# Patient Record
(demographics unavailable — no encounter records)

---

## 2024-10-11 NOTE — RISK ASSESSMENT
[0] : 2) Feeling down, depressed, or hopeless: Not at all (0) [PHQ-2 Negative - No further assessment needed] : PHQ-2 Negative - No further assessment needed [Has anyone in your immediate family (parents, grandparents, siblings) or other more distant relatives (aunts, uncles, cousins)  of heart] : Has anyone in your immediate family (parents, grandparents, siblings) or other more distant relatives (aunts, uncles, cousins)  of heart problems or had an unexpected sudden death before age 50 (This would include unexpected drownings, unexplained car accidents in which the relative was driving or sudden infant death syndrome.)? Yes [No Increased risk of SCA or SCD] : No Increased risk of SCA or SCD    [[id number]] : 0 [Have you ever fainted, passed out or had an unexplained seizure suddenly and without warning, especially during exercise or in response] : Have you ever fainted, passed out or had an unexplained seizure suddenly and without warning, especially during exercise or in response to sudden loud noises such as doorbells, alarm clocks and ringing telephones? No [Have you ever had exercise-related chest pain or shortness of breath?] : Have you ever had exercise-related chest pain or shortness of breath? No [Are you related to anyone with hypertrophic cardiomyopathy or hypertrophic obstructive cardiomyopathy, Marfan syndrome, arrhythmogenic] : Are you related to anyone with hypertrophic cardiomyopathy or hypertrophic obstructive cardiomyopathy, Marfan syndrome, arrhythmogenic right ventricular cardiomyopathy, long QT syndrome, short QT syndrome, Brugada syndrome or catecholaminergic polymorphic ventricular tachycardia, or anyone younger than 50 years with a pacemaker or implantable defibrillator? No

## 2024-10-11 NOTE — END OF VISIT
[] : 2 [FeedbackText] : Sibling visit. Lots of editing needed.  It also didn't capture some details of a new online school or the abnormal physical exam finding which I spoke a lot about during the visit.

## 2024-10-11 NOTE — DISCUSSION/SUMMARY
[Normal Growth] : growth [No Elimination Concerns] : elimination [Continue Regimen] : feeding [No Skin Concerns] : skin [Normal Sleep Pattern] : sleep [Delayed Problem Solving Skills] : delayed problem solving skills [None] : no medical problems [Anticipatory Guidance Given] : Anticipatory guidance addressed as per the history of present illness section [No Vaccines] : no vaccines needed [No Medications] : ~He/She~ is not on any medications [Patient] : patient [Parent/Guardian] : Parent/Guardian [] : The components of the vaccine(s) to be administered today are listed in the plan of care. The disease(s) for which the vaccine(s) are intended to prevent and the risks have been discussed with the caretaker.  The risks are also included in the appropriate vaccination information statements which have been provided to the patient's caregiver.  The caregiver has given consent to vaccinate. [Physical Growth and Development] : physical growth and development [Social and Academic Competence] : social and academic competence [Emotional Well-Being] : emotional well-being [Risk Reduction] : risk reduction [Violence and Injury Prevention] : violence and injury prevention [Met privately with the adolescent for part of the office visit?] : Met privately with the adolescent for part of the office visit? Yes [Initiated discussion about transfer to an adult healthcare provider?] : Initiated discussion about transfer to an adult healthcare provider? Yes  [Adolescent demonstrates understanding of his/her conditions and how to take prescribed medications?] : Adolescent demonstrates understanding of his/her conditions and how to take prescribed medications? No [Adolescent asks questions during each office  visit and participates in the care plan?] : Adolescent asks questions during each office visit and participates in the care plan? No [Adolescent is competent in independently making appointments, filling prescriptions, following up on referrals, and seeking emergency services, as needed?] : Adolescent is competent in independently making appointments, filling prescriptions, following up on referrals, and seeking emergency services, as needed? No [Adolescent's caregivers were provided with the opportunity to discuss their concerns about transferring decision making responsibility to the adolescent?] : Adolescent's caregivers were provided with the opportunity to discuss their concerns about transferring decision making responsibility to the adolescent? No [Discussed using Follow My Health to access health records and communicate with the adolescent's care team?] : Discussed using Follow My Health to access health records and communicate with the adolescent's care team? No [Discussed choices for adult care and assist in identifying possible care providers?] : Discussed choices for adult care and assist in identifying possible care providers? No [Initiated communication with the adult provider that the family and adolescent has selected?] : Initiated communication with the adult provider that the family and adolescent has selected? No [Provided copy of  transition letter?] : Provided copy of transition letter? No [Transferred health records?] : Transferred health records? No [Discussed nuances of care with the adult provider?] : Discussed nuances of care with the adult provider? No [Followed up after the transfer?] : Followed up after the transfer? No [FreeTextEntry1] : Family Wellness Screen was administered. No needs were identified. Total score 0.   Assessment:   - Summary: He is in general good health. Steffen's suspected scoliosis needs to be confirmed and appropriately addressed by an orthopedic surgeon. Furthermore, Steffen voiced feelings of anxiety which may need to be addressed by a therapist if persistent or affective of his daily life.   - Problems:     - Steffen: Suspected Scoliosis, Anxiety   - Differential Diagnosis:     - Scoliosis in Steffen: This needs to be confirmed or ruled out by an orthopedic surgeon     - Anxiety in Steffen: This could be either general anxiety vs situation-related anxiety   Plan:   - Summary: Ponce is advised to see an optometrist for clearer vision assessment and potential prescription of glasses. He also needs some basic screening labs as it's been a long time since his last blood workup. For Steffen, referral to an orthopedist is planned for further examination of the possible scoliosis. Screening labs should also be done for him due to the time since last blood workup. Finally, potential therapy sessions should be considered pertaining to his reported feelings of stress and anxiety.   - Plan:     - Steffen: Referral to orthopedist, Basic screening labs, Consideration of therapy sessions for anxiety     Given relatively high anxiety scores, I will refer to Ketty Bustos as well.

## 2024-10-11 NOTE — PHYSICAL EXAM
[Alert] : alert [No Acute Distress] : no acute distress [Normocephalic] : normocephalic [EOMI Bilateral] : EOMI bilateral [Clear tympanic membranes with bony landmarks and light reflex present bilaterally] : clear tympanic membranes with bony landmarks and light reflex present bilaterally  [Pink Nasal Mucosa] : pink nasal mucosa [Nonerythematous Oropharynx] : nonerythematous oropharynx [Supple, full passive range of motion] : supple, full passive range of motion [No Palpable Masses] : no palpable masses [Clear to Auscultation Bilaterally] : clear to auscultation bilaterally [Regular Rate and Rhythm] : regular rate and rhythm [Normal S1, S2 audible] : normal S1, S2 audible [No Murmurs] : no murmurs [+2 Femoral Pulses] : +2 femoral pulses [Soft] : soft [NonTender] : non tender [Non Distended] : non distended [Normoactive Bowel Sounds] : normoactive bowel sounds [No Hepatomegaly] : no hepatomegaly [No Splenomegaly] : no splenomegaly [No Abnormal Lymph Nodes Palpated] : no abnormal lymph nodes palpated [Normal Muscle Tone] : normal muscle tone [No Gait Asymmetry] : no gait asymmetry [No pain or deformities with palpation of bone, muscles, joints] : no pain or deformities with palpation of bone, muscles, joints [+2 Patella DTR] : +2 patella DTR [Cranial Nerves Grossly Intact] : cranial nerves grossly intact [de-identified] : 10 degree curvature of the spine [de-identified] : Multiple callouses and hyperpigmentation on his hands and knuckles

## 2024-10-11 NOTE — HISTORY OF PRESENT ILLNESS
[Mother] : mother [Yes] : Patient goes to dentist yearly [Needs Immunizations] : needs immunizations [Eats meals with family] : eats meals with family [Has family members/adults to turn to for help] : has family members/adults to turn to for help [Is permitted and is able to make independent decisions] : Is permitted and is able to make independent decisions [Eats regular meals including adequate fruits and vegetables] : eats regular meals including adequate fruits and vegetables [No] : Patient has not had sexual intercourse [With Teen] : teen [With Parent/Guardian] : parent/guardian [Sleep Concerns] : no sleep concerns [Has friends] : does not have friends [Has interests/participates in community activities/volunteers] : does not have interests/participates in community activities/volunteers [Uses electronic nicotine delivery system] : does not use electronic nicotine delivery system [Uses tobacco] : does not use tobacco [Uses drugs] : does not use drugs  [Drinks alcohol] : does not drink alcohol [de-identified] : Has always been home schooled.  Now he is starting an online school program.  [FreeTextEntry1] :    Subjective:   - Summary: Steffen is presenting for his routine physical examinations. He is generally healthy. Steffen reported feeling stressed and anxious about a planned trip to [facility]. Their mother reported no major concerns for both boys. Steffen was in the 40th percentile for his weight.   - Chief Complaint (CC): Routine Physical Examination   - History of Present Illness (HPI): Steffen reported feelings of anxiety particularly about a planned visit to [facility]. He also presented an abnormality, a possible early symptomatic scoliosis, to be examined further by an orthopedist.   - Past Medical History: Steffen has been homeschooled for a while now, but is about to attend a new online high school soon.   - Family History: The mother mentioned paternal side's history of diabetes.   - Social History: Steffen was reported to have been homeschooled and is expected to join an online high school soon. He engaged in recreational activities such as going to [employer] and travel ([place]). The new school is entitled [employer]", which is a school with an online program with small classrooms of 8-10 kids. He is about to undergo a developmental evaluation in [facility].  Unclear if there are therapies provided or not through the school, but the school will provide 504 accommodations.

## 2024-10-23 NOTE — PHYSICAL EXAM
[FreeTextEntry3] : hyperpigmentated of MCP, PIPs, scaly brown plaques of dorsum of hands   comedonal acne on cheeks and forehead

## 2024-10-23 NOTE — ASSESSMENT
[FreeTextEntry1] : #irritant contact dermatitis and #LSC of hands --chronic flaring --mom notes that patient bites hands; there are clear plaques of LSC on PIPs; discussed repeated biting will make lesions persist - START triamcinolone 0.1% ointment BID x 2 weeks PRN flare, take 1 week break before repeated cycles PRN flare. Avoid face, axilla, groin.  SED including atrophy, dyspigmentation, telangiectasias, striae. Proper use reviewed including only using to affected area and avoidance of prolonged use. -RTC 3 months  #acne --chronic flaring --start clindamycin lotion in AM and PM. SED. --start Panoxyl 4% BP wash in shower -start tretinoin 0.025% cream at bedtime. SED

## 2024-10-23 NOTE — HISTORY OF PRESENT ILLNESS
[FreeTextEntry1] : hand rash [de-identified] : 17 yr old male presents with mother as historian for eval of hand rash ongoing one year. Washes hands with dishwashing soap. Mother reports he bits his hands.

## 2024-10-25 NOTE — DATA REVIEWED
[de-identified] : 10/17/24:  AP and lateral full length spine radiographs were ordered, obtained, and independently reviewed today depicting nonstructural scoliosis. Kyphosis postural   Risser 5. No obvious deformity in the lateral plane. No evidence of spondylolisthesis.

## 2024-10-25 NOTE — DATA REVIEWED
[de-identified] : 10/17/24:  AP and lateral full length spine radiographs were ordered, obtained, and independently reviewed today depicting nonstructural scoliosis. Kyphosis postural   Risser 5. No obvious deformity in the lateral plane. No evidence of spondylolisthesis.

## 2024-10-25 NOTE — ASSESSMENT
[FreeTextEntry1] : 17-year-old male for evaluation of nonstructural scoliosis. Kyphosis postural   Today's assessment was performed with the assistance of the patient's parent as an independent historian to corroborate the patients history. Clinical exam and imaging reviewed with parent and patient at length. Natural history discussed.  Child is 17 years of age, Risser 5.  Patient is skeletally mature.  No evidence of scoliosis on imaging done today.  Observation only has been recommended.  Treatment algorithm for scoliosis has been discussed.  Bracing is warranted for curves measuring greater than 25 degrees with skeletal growth remaining.  The parent understands that the braces do not correct curves permanently and that there is 30% risk brace failure. Parent understands the risk of curve progression needing surgery. Surgery is recommended for scoliosis measuring greater than 45 degrees. I have recommended regular exercise for back and core strengthening and postural support.  Home exercise regimen with exercises to be done at least 5 days a week has also been recommended.  Home exercise handout sheet has been provided.  Activities as tolerated.  I have recommended follow-up on a as needed basis if further concerns arise.  All questions answered, understanding verbalized. Patient and family in agreement with plan of care.  Natural history of spine deformity discussed. Risk of progression explained..   Spine deformity can cause back pain later on and also arthritis, though usually later.. Spine deformity can affect organ systems,such as lungs, less commonly heart and GI etc over time depending on curve size and progression.Deformity can progress with growth but can continue to progress later on based on the size of the curve. It can also effect patient's height due to the curve..It usually does not impact activities and has no limitations, however activities may be limited due to pain or rarely breathlessness with large curves. Scoliosis is usually not impacted by daily activities- sleeping position, sitting position, lifting heavy weights etc, however posture and back pain can be affected by some of these.Stretching, exercises, bone health and nutrition are important factors in the long run.Spine deformity may have genetics etiology and so siblings and progenies should be evaluated.For scoliosis, curves less than 25 degrees are usually managed with observation. Bracing is warranted for curves measuring greater than 25 degrees with skeletal growth remaining.  Braces do not correct curves permanently and there is a 30% risk brace failure. Braces are effective in limiting progression if there is one year of growth remaining. Surgery is recommended for scoliosis measuring greater than 45 degrees.   Parent served as the primary historian regarding the above information for this visit to corroborate the patient's history. Clinical exam and imaging reviewed with patient and family at length.We also discussed/instructed back, core strengthening and posture correction exercises and going over the proper form as well the need to be regular on a daily basis. Importance was discussed and instructions printed.  For kyphosis, curves 45-60 degrees are usually managed with observation. Bracing is warranted for curves measuring greater than 60-65 degrees with skeletal growth remaining. Braces may correct curves permanently but there is a risk of brace failure. Surgery is recommended for kyphosis measuring 65 degrees with pain or 70 degrees or more. Brace option soft vs TLSO discussed. Exercises shown and printed instructions give. Importance of compliance discussed. PT given. Natural history with progression over time explained. Follow up stressed. Correct posture while sitting, working, studying discussed.  This note was generated using Dragon medical dictation software. A reasonable effort has been made for proofreading its contents, but typos may still remain. If there are any questions or points of clarification needed please do not hesitate to contact my office.  We spent 45 minutes on HPI, Clinical exam, ordering/ reviewing all imaging, reviewing any existing record, reviewing findings and counseling patient to treatment, differentials, etiology, prognosis, natural history, implications on ADLs, activities limitations/modifications,  answering questions and addressing concerns, treatment goals and documenting in the EHR.

## 2024-10-25 NOTE — HISTORY OF PRESENT ILLNESS
[0] : currently ~his/her~ pain is 0 out of 10 [FreeTextEntry1] : 17-year-old male, otherwise healthy presents today with mother for evaluation of spine asymmetry.  This was noted by pediatrician in October 2024 and orthopedic evaluation was recommended.  Mother denies recent large growth in height.  He denies back pain or activity limitations.  He denies extremity numbness, tingling, weakness, bowel or bladder dysfunction.  No known family history of scoliosis.  He presents today for further evaluation and management regarding the same.  No neurologic symptoms. No weakness in legs, tingling numbness bladder/bowel impairment. No back pain. No trauma, fever, shortness of breath, leg pain, back pain.

## 2024-10-25 NOTE — PHYSICAL EXAM
[FreeTextEntry1] : General: Patient is awake and alert and in no acute distress . oriented to person, place, and time. well developed, well nourished, cooperative.   Skin: The skin is intact, warm, pink, and dry over the area examined.    Eyes: normal conjunctiva, normal eyelids and pupils were equal and round.   ENT: normal ears, normal nose and normal lips.  Cardiovascular: There is brisk capillary refill in the digits of the affected extremity. They are symmetric pulses in the bilateral upper and lower extremities, positive peripheral pulses, brisk capillary refill, but no peripheral edema.  Respiratory: The patient is in no apparent respiratory distress. They're taking full deep breaths without use of accessory muscles or evidence of audible wheezes or stridor without the use of a stethoscope, normal respiratory effort.   Neurological: 5/5 motor strength in the main muscle groups of bilateral lower extremities, sensory intact in bilateral lower extremities.   Musculoskeletal:. Examination of the back reveals mild shoulder asymmetry.  Patient appears well-balanced.  On forward bending, mild thoracic asymmetry prominence noted.  Patient is able to bend forward and touch the toes as well bend backwards without pain.  Lateral flexion is symmetrical and is pain free.  Straight leg raising test is free to more than 70 degrees.   Neurological examination reveals a grade 5/5 muscle power.  Sensation is intact to crude touch and pinprick.  Deep tendon reflexes are 1+ with ankle jerk and knee jerk.  The plantars are bilaterally down going.  Superficial abdominal reflexes are symmetric and intact.  The biceps and triceps reflexes are 1+.     There is no hairy patch, lipoma, sinus in the back.  There is no pes cavus, asymmetry of calves, significant leg length discrepancy or significant cafe-au-lait spots.  Child is able to walk on tiptoes as well as heels without difficulty or pain. Child is able to jump and squat

## 2024-10-25 NOTE — REVIEW OF SYSTEMS
Yes [Change in Activity] : no change in activity [Fever Above 102] : no fever [Malaise] : no malaise [Rash] : no rash [Itching] : no itching

## 2024-12-06 NOTE — HISTORY OF PRESENT ILLNESS
[FreeTextEntry1] : FU: hand rash [de-identified] : MARIO ABRAHAM is a 17 year old who is presenting for evaluation of:  #Hand rash ongoing one year. Washes hands with dishwashing soap. Mother reports he bits his hands.  #Acne - Was not able to  tret

## 2024-12-06 NOTE — HISTORY OF PRESENT ILLNESS
[FreeTextEntry1] : FU: hand rash [de-identified] : MARIO ABRAHAM is a 17 year old who is presenting for evaluation of:  #Hand rash ongoing one year. Washes hands with dishwashing soap. Mother reports he bits his hands.  #Acne - Was not able to  tret

## 2024-12-06 NOTE — ASSESSMENT
[FreeTextEntry1] : #Irritant contact dermatitis and #LSC of hands with  #post inflammatory hyperpigmentation  Chronic flaring --mom notes that patient bites hands; there are clear plaques of LSC on PIPs; discussed repeated biting will make lesions persist - Can continue triamcinolone 0.1% ointment twice a week PRN flare, take 1 week break before repeated cycles PRN flare. Avoid face, axilla, groin.  SED including atrophy, dyspigmentation, telangiectasias, striae. Proper use reviewed including only using to affected area and avoidance of prolonged use. -RTC 3 months  #Acne Chronic flaring -Start tretinoin 0.025% cream at bedtime. SED  email: [email or web address]